# Patient Record
Sex: FEMALE | Race: WHITE | NOT HISPANIC OR LATINO | Employment: STUDENT | URBAN - METROPOLITAN AREA
[De-identification: names, ages, dates, MRNs, and addresses within clinical notes are randomized per-mention and may not be internally consistent; named-entity substitution may affect disease eponyms.]

---
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
## 2021-05-20 ENCOUNTER — OFFICE VISIT (OUTPATIENT)
Dept: FAMILY MEDICINE CLINIC | Facility: CLINIC | Age: 21
End: 2021-05-20

## 2021-05-20 VITALS
SYSTOLIC BLOOD PRESSURE: 118 MMHG | DIASTOLIC BLOOD PRESSURE: 70 MMHG | HEART RATE: 82 BPM | HEIGHT: 62 IN | BODY MASS INDEX: 27.42 KG/M2 | WEIGHT: 149 LBS | TEMPERATURE: 98.2 F | OXYGEN SATURATION: 99 %

## 2021-05-20 DIAGNOSIS — N91.2 AMENORRHEA: ICD-10-CM

## 2021-05-20 DIAGNOSIS — N91.1 SECONDARY AMENORRHEA: Primary | ICD-10-CM

## 2021-05-20 DIAGNOSIS — N64.4 BREAST TENDERNESS: ICD-10-CM

## 2021-05-20 DIAGNOSIS — K21.9 GASTROESOPHAGEAL REFLUX DISEASE WITHOUT ESOPHAGITIS: ICD-10-CM

## 2021-05-20 DIAGNOSIS — R53.83 FATIGUE, UNSPECIFIED TYPE: ICD-10-CM

## 2021-05-20 PROCEDURE — 99213 OFFICE O/P EST LOW 20 MIN: CPT | Performed by: FAMILY MEDICINE

## 2021-05-20 PROCEDURE — 81025 URINE PREGNANCY TEST: CPT | Performed by: FAMILY MEDICINE

## 2021-05-21 LAB
ALBUMIN SERPL-MCNC: 4.4 G/DL (ref 3.5–5.2)
ALBUMIN/GLOB SERPL: 2 G/DL
ALP SERPL-CCNC: 85 U/L (ref 39–117)
ALT SERPL-CCNC: 13 U/L (ref 1–33)
AST SERPL-CCNC: 15 U/L (ref 1–32)
B-HCG SERPL QL: NEGATIVE
BASOPHILS # BLD AUTO: 0.04 10*3/MM3 (ref 0–0.2)
BASOPHILS NFR BLD AUTO: 0.5 % (ref 0–1.5)
BILIRUB SERPL-MCNC: 0.4 MG/DL (ref 0–1.2)
BUN SERPL-MCNC: 11 MG/DL (ref 6–20)
BUN/CREAT SERPL: 15.7 (ref 7–25)
CALCIUM SERPL-MCNC: 9.6 MG/DL (ref 8.6–10.5)
CHLORIDE SERPL-SCNC: 105 MMOL/L (ref 98–107)
CO2 SERPL-SCNC: 24.6 MMOL/L (ref 22–29)
CREAT SERPL-MCNC: 0.7 MG/DL (ref 0.57–1)
EOSINOPHIL # BLD AUTO: 0.36 10*3/MM3 (ref 0–0.4)
EOSINOPHIL NFR BLD AUTO: 4.4 % (ref 0.3–6.2)
ERYTHROCYTE [DISTWIDTH] IN BLOOD BY AUTOMATED COUNT: 12.1 % (ref 12.3–15.4)
GLOBULIN SER CALC-MCNC: 2.2 GM/DL
GLUCOSE SERPL-MCNC: 79 MG/DL (ref 65–99)
HCT VFR BLD AUTO: 43.2 % (ref 34–46.6)
HGB BLD-MCNC: 14.1 G/DL (ref 12–15.9)
IMM GRANULOCYTES # BLD AUTO: 0.01 10*3/MM3 (ref 0–0.05)
IMM GRANULOCYTES NFR BLD AUTO: 0.1 % (ref 0–0.5)
LYMPHOCYTES # BLD AUTO: 2.88 10*3/MM3 (ref 0.7–3.1)
LYMPHOCYTES NFR BLD AUTO: 35.6 % (ref 19.6–45.3)
MCH RBC QN AUTO: 30.7 PG (ref 26.6–33)
MCHC RBC AUTO-ENTMCNC: 32.6 G/DL (ref 31.5–35.7)
MCV RBC AUTO: 93.9 FL (ref 79–97)
MONOCYTES # BLD AUTO: 0.63 10*3/MM3 (ref 0.1–0.9)
MONOCYTES NFR BLD AUTO: 7.8 % (ref 5–12)
NEUTROPHILS # BLD AUTO: 4.18 10*3/MM3 (ref 1.7–7)
NEUTROPHILS NFR BLD AUTO: 51.6 % (ref 42.7–76)
NRBC BLD AUTO-RTO: 0 /100 WBC (ref 0–0.2)
PLATELET # BLD AUTO: 276 10*3/MM3 (ref 140–450)
POTASSIUM SERPL-SCNC: 4.6 MMOL/L (ref 3.5–5.2)
PROLACTIN SERPL-MCNC: 11.2 NG/ML (ref 4.8–23.3)
PROT SERPL-MCNC: 6.6 G/DL (ref 6–8.5)
RBC # BLD AUTO: 4.6 10*6/MM3 (ref 3.77–5.28)
SODIUM SERPL-SCNC: 142 MMOL/L (ref 136–145)
TSH SERPL DL<=0.005 MIU/L-ACNC: 1.25 UIU/ML (ref 0.27–4.2)
WBC # BLD AUTO: 8.1 10*3/MM3 (ref 3.4–10.8)

## 2022-07-01 ENCOUNTER — OFFICE VISIT (OUTPATIENT)
Dept: FAMILY MEDICINE CLINIC | Facility: CLINIC | Age: 22
End: 2022-07-01

## 2022-07-01 VITALS
TEMPERATURE: 98 F | OXYGEN SATURATION: 98 % | DIASTOLIC BLOOD PRESSURE: 72 MMHG | SYSTOLIC BLOOD PRESSURE: 110 MMHG | WEIGHT: 148 LBS | BODY MASS INDEX: 27.23 KG/M2 | HEIGHT: 62 IN | HEART RATE: 58 BPM

## 2022-07-01 DIAGNOSIS — N36.8 URETHRAL IRRITATION: Primary | ICD-10-CM

## 2022-07-01 DIAGNOSIS — R30.0 DYSURIA: ICD-10-CM

## 2022-07-01 DIAGNOSIS — N89.8 VAGINAL DISCHARGE: ICD-10-CM

## 2022-07-01 PROCEDURE — 99213 OFFICE O/P EST LOW 20 MIN: CPT | Performed by: FAMILY MEDICINE

## 2022-07-01 RX ORDER — NITROFURANTOIN 25; 75 MG/1; MG/1
100 CAPSULE ORAL 2 TIMES DAILY
Qty: 10 CAPSULE | Refills: 0 | Status: SHIPPED | OUTPATIENT
Start: 2022-07-01 | End: 2022-07-06

## 2022-07-01 NOTE — PROGRESS NOTES
"Chief Complaint   Patient presents with   • Urinary Tract Infection       Urinary Tract Infection: Patient complains of burning with urination and frequency She has had symptoms for 5 days. Patient also complains of vaginal discharge. Patient denies back pain. Patient does not have a history of recurrent UTI.  Patient does not have a history of pyelonephritis. Used product/oil in pelvic area possibly contributing to irritation    Vitals:    07/01/22 1147   BP: 110/72   Pulse: 58   Temp: 98 °F (36.7 °C)   SpO2: 98%   Weight: 67.1 kg (148 lb)   Height: 157.5 cm (62.01\")     Gen: NAD,  alert  HEENT: WNL  Lung: regular RR, no audible wheeze  Heart: RR without murmur  Abd: non tender. : some mild urethral irrigation, clear Vaginal discharge      In office urine dipstick results:  Brief Urine Lab Results     None        UA dip was negative      Assessment & Plan   Diagnoses and all orders for this visit:    1. Urethral irritation (Primary)  -     NuSwab BV & Candida - Swab, Vagina    2. Vaginal discharge  -     NuSwab BV & Candida - Swab, Vagina    3. Dysuria    Other orders  -     nitrofurantoin, macrocrystal-monohydrate, (Macrobid) 100 MG capsule; Take 1 capsule by mouth 2 (Two) Times a Day for 5 days.  Dispense: 10 capsule; Refill: 0      tx macrobid for anti-inflamm and empirically for possible UTI, vaginal swab also pending       Tylenol or Advil as needed for pain, fever  Plenty of fluids  OTC Azo ok  Off work or school note given if needed.  Pros and cons of antibiotic use discussed    Dr. Angelina Cordova MD  [place]  [facility]    "

## 2022-07-04 LAB
A VAGINAE DNA VAG QL NAA+PROBE: NORMAL SCORE
BVAB2 DNA VAG QL NAA+PROBE: NORMAL SCORE
C ALBICANS DNA VAG QL NAA+PROBE: NEGATIVE
C GLABRATA DNA VAG QL NAA+PROBE: NEGATIVE
MEGA1 DNA VAG QL NAA+PROBE: NORMAL SCORE

## 2022-12-02 ENCOUNTER — OFFICE VISIT (OUTPATIENT)
Dept: FAMILY MEDICINE CLINIC | Facility: CLINIC | Age: 22
End: 2022-12-02

## 2022-12-02 VITALS
WEIGHT: 148 LBS | SYSTOLIC BLOOD PRESSURE: 110 MMHG | TEMPERATURE: 98.2 F | BODY MASS INDEX: 27.23 KG/M2 | HEART RATE: 79 BPM | HEIGHT: 62 IN | OXYGEN SATURATION: 99 % | DIASTOLIC BLOOD PRESSURE: 72 MMHG

## 2022-12-02 DIAGNOSIS — R22.9 SUBCUTANEOUS NODULE: Primary | ICD-10-CM

## 2022-12-02 PROCEDURE — 99213 OFFICE O/P EST LOW 20 MIN: CPT | Performed by: FAMILY MEDICINE

## 2022-12-02 NOTE — PROGRESS NOTES
Chief Complaint   Patient presents with   • Mass       Subjective   Anita Salas is a 22 y.o. female.     History of Present Illness add on visit    She noted a small subcutaneous bump on her mid upper abdomen earlier today    It became tender after she scratched the area. No obvious overlying redness or skin irritation, no abscess seen or palpated, not easily located on exam, no other systemic symptoms. No hx skin infections    The following portions of the patient's history were reviewed and updated as appropriate: allergies, current medications, past family history, past medical history, past social history, past surgical history and problem list.      No past medical history on file.    No past surgical history on file.    Family History   Problem Relation Age of Onset   • No Known Problems Mother    • No Known Problems Father        Social History     Socioeconomic History   • Marital status:    Tobacco Use   • Smoking status: Never   • Smokeless tobacco: Never   Vaping Use   • Vaping Use: Former   • Substances: Nicotine   • Devices: Disposable, Pre-filled or refillable cartridge   Substance and Sexual Activity   • Alcohol use: No   • Drug use: No   • Sexual activity: Never       No current outpatient medications on file prior to visit.     No current facility-administered medications on file prior to visit.       Review of Systems   Constitutional: Negative for fever.   HENT: Negative for congestion.    Cardiovascular: Negative for chest pain.   Skin: Positive for skin lesions.           Vitals:    12/02/22 1416   BP: 110/72   Pulse: 79   Temp: 98.2 °F (36.8 °C)   SpO2: 99%      Objective   Physical Exam  Vitals reviewed.   Cardiovascular:      Rate and Rhythm: Normal rate and regular rhythm.      Pulses: Normal pulses.   Pulmonary:      Effort: Pulmonary effort is normal.   Abdominal:      General: Abdomen is flat.   Neurological:      Mental Status: She is alert.   Psychiatric:         Mood and  Affect: Mood normal.       small subcutaneous bump on her mid upper abdomen not easily located on exam     Assessment & Plan   Problems Addressed this Visit    None  Visit Diagnoses     Subcutaneous nodule    -  Primary    Relevant Orders    US soft tissue      Diagnoses       Codes Comments    Subcutaneous nodule    -  Primary ICD-10-CM: R22.9  ICD-9-CM: 782.2         Small cyst versus lipoma versus other  Abdominal exam reassuring    Would have her start warm compresses or aleve bid for a week and give us an update. Can then further investigate if needed           Angelina Cordova MD

## 2022-12-09 ENCOUNTER — HOSPITAL ENCOUNTER (OUTPATIENT)
Dept: ULTRASOUND IMAGING | Facility: HOSPITAL | Age: 22
Discharge: HOME | End: 2022-12-09
Admitting: FAMILY MEDICINE

## 2022-12-09 DIAGNOSIS — R22.9 SUBCUTANEOUS NODULE: ICD-10-CM

## 2022-12-09 PROCEDURE — 76999 ECHO EXAMINATION PROCEDURE: CPT

## 2023-05-30 ENCOUNTER — OFFICE VISIT (OUTPATIENT)
Dept: FAMILY MEDICINE CLINIC | Facility: CLINIC | Age: 23
End: 2023-05-30

## 2023-05-30 VITALS
DIASTOLIC BLOOD PRESSURE: 72 MMHG | OXYGEN SATURATION: 99 % | HEART RATE: 75 BPM | TEMPERATURE: 97.8 F | HEIGHT: 62 IN | SYSTOLIC BLOOD PRESSURE: 120 MMHG | WEIGHT: 152 LBS | BODY MASS INDEX: 27.97 KG/M2

## 2023-05-30 DIAGNOSIS — R11.2 NAUSEA AND VOMITING, UNSPECIFIED VOMITING TYPE: Primary | ICD-10-CM

## 2023-05-30 DIAGNOSIS — R51.9 HEADACHE IN BACK OF HEAD: ICD-10-CM

## 2023-05-30 DIAGNOSIS — R10.84 GENERALIZED ABDOMINAL PAIN: ICD-10-CM

## 2023-05-30 DIAGNOSIS — G44.89 FOOD SENSITIVITY HEADACHE: ICD-10-CM

## 2023-05-30 PROCEDURE — 99214 OFFICE O/P EST MOD 30 MIN: CPT | Performed by: FAMILY MEDICINE

## 2023-05-30 NOTE — PROGRESS NOTES
Subjective   Anita Salas is a 22 y.o. female with   Chief Complaint   Patient presents with   • Vomiting     Started 3-4 weeks ago, would wake up in middle of night    • Headache   • Dizziness   • Neck Pain     Left    • Abdominal Pain   • Diarrhea     In the morning and after meals    • Chills     Only when has to go bathroom    .    History of Present Illness   22-year-old white female here with complaint of spontaneous onset of generalized abdominal pain usually starting at 7 to 8:00 at night.  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  Usually she is home at 5 and eats supper right after she arrives.  She generally does well until the above 7-8 o'clock timeframe when she develops abdominal discomfort/tightening as well as nausea and at times will actually experience emesis.  She has seen no hematemesis and there have been no bowel changes such as constipation, diarrhea, melena or hematochezia.  Patient denies fever but states that the abdominal discomfort will radiate up into her left neck.  She does have diarrhea on occasion but not until the next morning.  She wonders about her thyroid as well as possible food sensitivities- has family members that have the above including sister and father.  Patient does experience headaches with the above including lightheadedness on occasion.  Patient is also  and is having unprotected intercourse.  She states that her menses are regular and normal on a monthly basis last menses was at the beginning of this month.  She denies fatigue or breast tenderness.  The following portions of the patient's history were reviewed and updated as appropriate: allergies, current medications, past family history, past medical history, past social history, past surgical history and problem list.    Review of Systems   Constitutional: Positive for chills. Negative for fatigue and fever.   Gastrointestinal: Positive for  abdominal pain, diarrhea, nausea and vomiting.   Musculoskeletal: Positive for neck pain.   Neurological: Positive for dizziness and headaches.       Objective     Vitals:    05/30/23 1044   BP: 120/72   Pulse: 75   Temp: 97.8 °F (36.6 °C)   SpO2: 99%       No results found for this or any previous visit (from the past 672 hour(s)).    Physical Exam  Vitals and nursing note reviewed.   Constitutional:       Appearance: Normal appearance. She is well-developed and well-groomed.   HENT:      Head: Normocephalic and atraumatic.   Neck:      Thyroid: No thyroid mass or thyromegaly.      Vascular: Normal carotid pulses. No carotid bruit.      Trachea: Trachea and phonation normal.   Cardiovascular:      Rate and Rhythm: Normal rate and regular rhythm.      Heart sounds: Normal heart sounds. No murmur heard.    No friction rub. No gallop.   Pulmonary:      Effort: Pulmonary effort is normal. No respiratory distress.      Breath sounds: Normal breath sounds. No decreased breath sounds, wheezing, rhonchi or rales.   Abdominal:      General: Abdomen is flat. Bowel sounds are normal. There is no distension.      Palpations: Abdomen is soft. There is no hepatomegaly, splenomegaly or mass.      Tenderness: There is no abdominal tenderness. There is no right CVA tenderness, left CVA tenderness, guarding or rebound.      Hernia: No hernia is present.   Musculoskeletal:      Cervical back: Neck supple.   Lymphadenopathy:      Cervical: No cervical adenopathy.   Skin:     General: Skin is warm and dry.      Findings: No rash.   Neurological:      Mental Status: She is alert and oriented to person, place, and time.      Cranial Nerves: Cranial nerves 2-12 are intact.      Sensory: Sensation is intact.      Motor: Motor function is intact.      Gait: Gait is intact.      Deep Tendon Reflexes: Reflexes are normal and symmetric.   Psychiatric:         Attention and Perception: Attention and perception normal.         Mood and Affect:  Mood and affect normal.         Speech: Speech normal.         Behavior: Behavior normal. Behavior is cooperative.         Thought Content: Thought content normal.         Cognition and Memory: Cognition and memory normal.         Judgment: Judgment normal.         Assessment & Plan   Diagnoses and all orders for this visit:    1. Nausea and vomiting, unspecified vomiting type (Primary)  -     Comprehensive metabolic panel  -     Amylase  -     TSH  -     Vitamin D 25 hydroxy  -     Lipase  -     CBC w AUTO Differential  -     Allergen Food Panel  -     hCG, Serum, Qualitative    2. Headache in back of head  -     Comprehensive metabolic panel  -     Amylase  -     TSH  -     Vitamin D 25 hydroxy  -     Lipase  -     CBC w AUTO Differential  -     Allergen Food Panel  -     hCG, Serum, Qualitative    3. Generalized abdominal pain  -     Comprehensive metabolic panel  -     Amylase  -     TSH  -     Vitamin D 25 hydroxy  -     Lipase  -     CBC w AUTO Differential  -     Allergen Food Panel  -     hCG, Serum, Qualitative    4. Food sensitivity headache  -     Comprehensive metabolic panel  -     Amylase  -     TSH  -     Vitamin D 25 hydroxy  -     Lipase  -     CBC w AUTO Differential  -     Allergen Food Panel  -     hCG, Serum, Qualitative    Patient has been asked to pay attention to what she eats for dinner at night and its correlation possibly to her episodes later on in the evening.    Return if symptoms worsen or fail to improve.

## 2023-06-03 LAB
25(OH)D3+25(OH)D2 SERPL-MCNC: 36.5 NG/ML (ref 30–100)
ALBUMIN SERPL-MCNC: 4.5 G/DL (ref 3.5–5.2)
ALBUMIN/GLOB SERPL: 2 G/DL
ALP SERPL-CCNC: 68 U/L (ref 39–117)
ALT SERPL-CCNC: 16 U/L (ref 1–33)
AMYLASE SERPL-CCNC: 70 U/L (ref 28–100)
AST SERPL-CCNC: 16 U/L (ref 1–32)
B-HCG SERPL QL: NEGATIVE
BARLEY IGE QN: <0.1 KU/L
BASOPHILS # BLD AUTO: 0.05 10*3/MM3 (ref 0–0.2)
BASOPHILS NFR BLD AUTO: 0.6 % (ref 0–1.5)
BEEF IGE QN: <0.1 KU/L
BILIRUB SERPL-MCNC: 0.3 MG/DL (ref 0–1.2)
BUN SERPL-MCNC: 13 MG/DL (ref 6–20)
BUN/CREAT SERPL: 16.5 (ref 7–25)
CABBAGE IGE QN: <0.1 KU/L
CALCIUM SERPL-MCNC: 9.7 MG/DL (ref 8.6–10.5)
CARROT IGE QN: <0.1 KU/L
CHICKEN MEAT IGE QN: <0.1 KU/L
CHLORIDE SERPL-SCNC: 104 MMOL/L (ref 98–107)
CO2 SERPL-SCNC: 25.9 MMOL/L (ref 22–29)
CODFISH IGE QN: <0.1 KU/L
CONV CLASS DESCRIPTION: NORMAL
CORN IGE QN: <0.1 KU/L
COW MILK IGE QN: <0.1 KU/L
CRAB IGE QN: <0.1 KU/L
CREAT SERPL-MCNC: 0.79 MG/DL (ref 0.57–1)
EGFRCR SERPLBLD CKD-EPI 2021: 109.3 ML/MIN/1.73
EGG WHITE IGE QN: <0.1 KU/L
EOSINOPHIL # BLD AUTO: 0.3 10*3/MM3 (ref 0–0.4)
EOSINOPHIL NFR BLD AUTO: 3.8 % (ref 0.3–6.2)
ERYTHROCYTE [DISTWIDTH] IN BLOOD BY AUTOMATED COUNT: 12.1 % (ref 12.3–15.4)
GLOBULIN SER CALC-MCNC: 2.2 GM/DL
GLUCOSE SERPL-MCNC: 82 MG/DL (ref 65–99)
GRAPE IGE QN: <0.1 KU/L
GREEN PEPPER IGE QN: <0.1 KU/L
HCT VFR BLD AUTO: 41.3 % (ref 34–46.6)
HGB BLD-MCNC: 13.5 G/DL (ref 12–15.9)
IMM GRANULOCYTES # BLD AUTO: 0.02 10*3/MM3 (ref 0–0.05)
IMM GRANULOCYTES NFR BLD AUTO: 0.3 % (ref 0–0.5)
LETTUCE IGE QN: <0.1 KU/L
LIPASE SERPL-CCNC: 31 U/L (ref 13–60)
LYMPHOCYTES # BLD AUTO: 2.43 10*3/MM3 (ref 0.7–3.1)
LYMPHOCYTES NFR BLD AUTO: 30.4 % (ref 19.6–45.3)
MCH RBC QN AUTO: 30.3 PG (ref 26.6–33)
MCHC RBC AUTO-ENTMCNC: 32.7 G/DL (ref 31.5–35.7)
MCV RBC AUTO: 92.8 FL (ref 79–97)
MONOCYTES # BLD AUTO: 0.65 10*3/MM3 (ref 0.1–0.9)
MONOCYTES NFR BLD AUTO: 8.1 % (ref 5–12)
NEUTROPHILS # BLD AUTO: 4.55 10*3/MM3 (ref 1.7–7)
NEUTROPHILS NFR BLD AUTO: 56.8 % (ref 42.7–76)
NRBC BLD AUTO-RTO: 0 /100 WBC (ref 0–0.2)
OAT IGE QN: <0.1 KU/L
ORANGE IGE QN: <0.1 KU/L
PEANUT IGE QN: <0.1 KU/L
PLATELET # BLD AUTO: 260 10*3/MM3 (ref 140–450)
PORK IGE QN: <0.1 KU/L
POTASSIUM SERPL-SCNC: 4.3 MMOL/L (ref 3.5–5.2)
POTATO IGE QN: <0.1 KU/L
PROT SERPL-MCNC: 6.7 G/DL (ref 6–8.5)
RBC # BLD AUTO: 4.45 10*6/MM3 (ref 3.77–5.28)
RICE IGE QN: <0.1 KU/L
RYE IGE QN: <0.1 KU/L
SHRIMP IGE QN: <0.1 KU/L
SODIUM SERPL-SCNC: 139 MMOL/L (ref 136–145)
SOYBEAN IGE QN: <0.1 KU/L
TOMATO IGE QN: <0.1 KU/L
TSH SERPL DL<=0.005 MIU/L-ACNC: 1.95 UIU/ML (ref 0.27–4.2)
TUNA IGE QN: <0.1 KU/L
WBC # BLD AUTO: 8 10*3/MM3 (ref 3.4–10.8)
WHEAT IGE QN: <0.1 KU/L
WHITE BEAN IGE QN: <0.1 KU/L

## 2024-02-20 ENCOUNTER — APPOINTMENT (OUTPATIENT)
Dept: ULTRASOUND IMAGING | Facility: HOSPITAL | Age: 24
End: 2024-02-20
Payer: COMMERCIAL

## 2024-02-20 ENCOUNTER — HOSPITAL ENCOUNTER (EMERGENCY)
Facility: HOSPITAL | Age: 24
Discharge: HOME | End: 2024-02-20
Attending: EMERGENCY MEDICINE | Admitting: EMERGENCY MEDICINE
Payer: COMMERCIAL

## 2024-02-20 VITALS
OXYGEN SATURATION: 100 % | HEIGHT: 62 IN | BODY MASS INDEX: 23.9 KG/M2 | HEART RATE: 78 BPM | SYSTOLIC BLOOD PRESSURE: 126 MMHG | TEMPERATURE: 98.2 F | WEIGHT: 129.9 LBS | RESPIRATION RATE: 18 BRPM | DIASTOLIC BLOOD PRESSURE: 80 MMHG

## 2024-02-20 DIAGNOSIS — O20.9 BLEEDING IN EARLY PREGNANCY: Primary | ICD-10-CM

## 2024-02-20 LAB
ABO GROUP BLD: NORMAL
ALBUMIN SERPL-MCNC: 4.2 G/DL (ref 3.5–5.2)
ALBUMIN/GLOB SERPL: 1.8 G/DL
ALP SERPL-CCNC: 64 U/L (ref 39–117)
ALT SERPL W P-5'-P-CCNC: 15 U/L (ref 1–33)
ANION GAP SERPL CALCULATED.3IONS-SCNC: 10.1 MMOL/L (ref 5–15)
AST SERPL-CCNC: 19 U/L (ref 1–32)
BACTERIA UR QL AUTO: ABNORMAL /HPF
BASOPHILS # BLD AUTO: 0.06 10*3/MM3 (ref 0–0.2)
BASOPHILS NFR BLD AUTO: 0.7 % (ref 0–1.5)
BILIRUB SERPL-MCNC: 0.3 MG/DL (ref 0–1.2)
BILIRUB UR QL STRIP: NEGATIVE
BUN SERPL-MCNC: 12 MG/DL (ref 6–20)
BUN/CREAT SERPL: 15.4 (ref 7–25)
CALCIUM SPEC-SCNC: 8.8 MG/DL (ref 8.6–10.5)
CHLORIDE SERPL-SCNC: 104 MMOL/L (ref 98–107)
CLARITY UR: CLEAR
CO2 SERPL-SCNC: 22.9 MMOL/L (ref 22–29)
COLOR UR: YELLOW
CREAT SERPL-MCNC: 0.78 MG/DL (ref 0.57–1)
DEPRECATED RDW RBC AUTO: 44.9 FL (ref 37–54)
EGFRCR SERPLBLD CKD-EPI 2021: 110.3 ML/MIN/1.73
EOSINOPHIL # BLD AUTO: 0.37 10*3/MM3 (ref 0–0.4)
EOSINOPHIL NFR BLD AUTO: 4.6 % (ref 0.3–6.2)
ERYTHROCYTE [DISTWIDTH] IN BLOOD BY AUTOMATED COUNT: 12.7 % (ref 12.3–15.4)
GLOBULIN UR ELPH-MCNC: 2.4 GM/DL
GLUCOSE SERPL-MCNC: 86 MG/DL (ref 65–99)
GLUCOSE UR STRIP-MCNC: NEGATIVE MG/DL
HCG INTACT+B SERPL-ACNC: 22.58 MIU/ML
HCT VFR BLD AUTO: 42.3 % (ref 34–46.6)
HGB BLD-MCNC: 14 G/DL (ref 12–15.9)
HGB UR QL STRIP.AUTO: ABNORMAL
HYALINE CASTS UR QL AUTO: ABNORMAL /LPF
IMM GRANULOCYTES # BLD AUTO: 0.01 10*3/MM3 (ref 0–0.05)
IMM GRANULOCYTES NFR BLD AUTO: 0.1 % (ref 0–0.5)
KETONES UR QL STRIP: NEGATIVE
LEUKOCYTE ESTERASE UR QL STRIP.AUTO: NEGATIVE
LYMPHOCYTES # BLD AUTO: 2.3 10*3/MM3 (ref 0.7–3.1)
LYMPHOCYTES NFR BLD AUTO: 28.4 % (ref 19.6–45.3)
MCH RBC QN AUTO: 31.4 PG (ref 26.6–33)
MCHC RBC AUTO-ENTMCNC: 33.1 G/DL (ref 31.5–35.7)
MCV RBC AUTO: 94.8 FL (ref 79–97)
MONOCYTES # BLD AUTO: 0.71 10*3/MM3 (ref 0.1–0.9)
MONOCYTES NFR BLD AUTO: 8.8 % (ref 5–12)
NEUTROPHILS NFR BLD AUTO: 4.64 10*3/MM3 (ref 1.7–7)
NEUTROPHILS NFR BLD AUTO: 57.4 % (ref 42.7–76)
NITRITE UR QL STRIP: NEGATIVE
PH UR STRIP.AUTO: 6 [PH] (ref 4.5–8)
PLATELET # BLD AUTO: 258 10*3/MM3 (ref 140–450)
PMV BLD AUTO: 9.5 FL (ref 6–12)
POTASSIUM SERPL-SCNC: 3.7 MMOL/L (ref 3.5–5.2)
PROT SERPL-MCNC: 6.6 G/DL (ref 6–8.5)
PROT UR QL STRIP: NEGATIVE
RBC # BLD AUTO: 4.46 10*6/MM3 (ref 3.77–5.28)
RBC # UR STRIP: ABNORMAL /HPF
REF LAB TEST METHOD: ABNORMAL
RH BLD: POSITIVE
SODIUM SERPL-SCNC: 137 MMOL/L (ref 136–145)
SP GR UR STRIP: 1.03 (ref 1–1.03)
SQUAMOUS #/AREA URNS HPF: ABNORMAL /HPF
UROBILINOGEN UR QL STRIP: ABNORMAL
WBC # UR STRIP: ABNORMAL /HPF
WBC NRBC COR # BLD AUTO: 8.09 10*3/MM3 (ref 3.4–10.8)

## 2024-02-20 PROCEDURE — 81001 URINALYSIS AUTO W/SCOPE: CPT | Performed by: PHYSICIAN ASSISTANT

## 2024-02-20 PROCEDURE — 84702 CHORIONIC GONADOTROPIN TEST: CPT | Performed by: PHYSICIAN ASSISTANT

## 2024-02-20 PROCEDURE — 86901 BLOOD TYPING SEROLOGIC RH(D): CPT | Performed by: PHYSICIAN ASSISTANT

## 2024-02-20 PROCEDURE — 85025 COMPLETE CBC W/AUTO DIFF WBC: CPT | Performed by: PHYSICIAN ASSISTANT

## 2024-02-20 PROCEDURE — 86900 BLOOD TYPING SEROLOGIC ABO: CPT | Performed by: PHYSICIAN ASSISTANT

## 2024-02-20 PROCEDURE — P9612 CATHETERIZE FOR URINE SPEC: HCPCS

## 2024-02-20 PROCEDURE — 80053 COMPREHEN METABOLIC PANEL: CPT | Performed by: PHYSICIAN ASSISTANT

## 2024-02-20 PROCEDURE — 99284 EMERGENCY DEPT VISIT MOD MDM: CPT

## 2024-02-20 PROCEDURE — 36415 COLL VENOUS BLD VENIPUNCTURE: CPT

## 2024-02-20 PROCEDURE — 76817 TRANSVAGINAL US OBSTETRIC: CPT

## 2024-02-20 RX ORDER — PRENATAL VIT NO.126/IRON/FOLIC 28MG-0.8MG
1 TABLET ORAL DAILY
COMMUNITY

## 2024-02-20 RX ORDER — FERROUS SULFATE 325(65) MG
325 TABLET ORAL
COMMUNITY

## 2024-02-20 NOTE — ED PROVIDER NOTES
Subjective   History of Present Illness  Patient is a healthy 23-year-old female whose last period was round January 16.  She had a positive home pregnancy test on Monday and is scheduled to have her first OB visit in about 2 weeks.  She said that she had a home pregnancy test positive once in the past and is not sure if it was a false positive or if she had an early miscarriage.    She started having vaginal bleeding today without clots.  She says it has not been very heavy earlier.  She has had some lower abdominal cramping.  She denies any chance of STD and has not had symptoms of itching burning or in usual discharge.  She knows she has positive blood type.      Review of Systems   Constitutional: Negative.    HENT: Negative.     Gastrointestinal:  Positive for abdominal pain.   Genitourinary:  Positive for vaginal bleeding. Negative for vaginal discharge and vaginal pain.   All other systems reviewed and are negative.      History reviewed. No pertinent past medical history.    No Known Allergies    History reviewed. No pertinent surgical history.    Family History   Problem Relation Age of Onset    No Known Problems Mother     No Known Problems Father        Social History     Socioeconomic History    Marital status:    Tobacco Use    Smoking status: Never    Smokeless tobacco: Never   Vaping Use    Vaping Use: Former    Substances: Nicotine    Devices: Disposable, Pre-filled or refillable cartridge   Substance and Sexual Activity    Alcohol use: No    Drug use: No    Sexual activity: Never           Objective   Physical Exam  Vitals reviewed.   Constitutional:       Appearance: Normal appearance. She is normal weight.   Eyes:      Conjunctiva/sclera: Conjunctivae normal.   Cardiovascular:      Rate and Rhythm: Normal rate and regular rhythm.      Heart sounds: Normal heart sounds.   Pulmonary:      Effort: Pulmonary effort is normal.      Breath sounds: Normal breath sounds.   Abdominal:      General:  Abdomen is flat. Bowel sounds are normal. There is no distension.      Tenderness: There is no abdominal tenderness. There is no right CVA tenderness or guarding.   Musculoskeletal:         General: Normal range of motion.   Skin:     General: Skin is warm and dry.   Neurological:      Mental Status: She is alert.   Psychiatric:         Mood and Affect: Mood normal.         Behavior: Behavior normal.         Procedures           ED Course  ED Course as of 24 1648   Tue 2024   1423 HCG Quantitative: 22.58 [AN]   1526 HCG Quantitative: 22.58 [AN]      ED Course User Index  [AN] Rosaura Gillis PA-C                                             Medical Decision Making  Presentation patient is well-appearing and nontoxic.  She has benign abdominal exam.  Vital signs stable.    She said that her bleeding did  after she had the ultrasound and is not passing any clots.  Her hCG is 22 which could be missed  versus early pregnancy.  Other labs reassuring and no evidence of UTI.  Blood type a positive.    Ultrasound does not show evidence of IUP and could not rule out ectopic.  I discussed this with patient.  With hCG of 22 I would not expect to see fetus although it could be that but hCG is decreasing and there has been a miscarriage or an abnormal miscarriage that is not visualized.  She knows to follow-up with OB in the next couple days to have a repeat of hCG.  I also told her that miscarriage cannot be stopped in first trimester and her safety is her primary concern.  She should return to emergency room if she has any concerns and this can include heavy bleeding.  At this time she is medically cleared to follow-up with gynecologist in 2 days.    Problems Addressed:  Bleeding in early pregnancy: complicated acute illness or injury    Amount and/or Complexity of Data Reviewed  Labs: ordered. Decision-making details documented in ED Course.        Final diagnoses:   Bleeding in early pregnancy        ED Disposition  ED Disposition       ED Disposition   Discharge    Condition   Stable    Comment   --               Kayla Rodriguez, APRN  [address] [address] [phone]    In 2 days           Medication List      No changes were made to your prescriptions during this visit.            Rosaura Gillis PA-C  02/20/24 2646

## 2024-02-21 ENCOUNTER — HOSPITAL ENCOUNTER (EMERGENCY)
Facility: HOSPITAL | Age: 24
Discharge: HOME | End: 2024-02-21
Attending: EMERGENCY MEDICINE | Admitting: EMERGENCY MEDICINE
Payer: COMMERCIAL

## 2024-02-21 VITALS
RESPIRATION RATE: 18 BRPM | HEIGHT: 62 IN | DIASTOLIC BLOOD PRESSURE: 74 MMHG | BODY MASS INDEX: 23.74 KG/M2 | OXYGEN SATURATION: 100 % | TEMPERATURE: 98 F | WEIGHT: 129 LBS | HEART RATE: 70 BPM | SYSTOLIC BLOOD PRESSURE: 104 MMHG

## 2024-02-21 DIAGNOSIS — O20.0 THREATENED ABORTION IN EARLY PREGNANCY: Primary | ICD-10-CM

## 2024-02-21 LAB
HCG INTACT+B SERPL-ACNC: 16.24 MIU/ML
HCT VFR BLD AUTO: 40.8 % (ref 34–46.6)
HGB BLD-MCNC: 13.4 G/DL (ref 12–15.9)
HOLD SPECIMEN: NORMAL
HOLD SPECIMEN: NORMAL
WHOLE BLOOD HOLD COAG: NORMAL
WHOLE BLOOD HOLD SPECIMEN: NORMAL

## 2024-02-21 PROCEDURE — 85014 HEMATOCRIT: CPT | Performed by: EMERGENCY MEDICINE

## 2024-02-21 PROCEDURE — 84702 CHORIONIC GONADOTROPIN TEST: CPT | Performed by: EMERGENCY MEDICINE

## 2024-02-21 PROCEDURE — 99283 EMERGENCY DEPT VISIT LOW MDM: CPT

## 2024-02-21 PROCEDURE — 85018 HEMOGLOBIN: CPT | Performed by: EMERGENCY MEDICINE

## 2024-02-21 NOTE — ED PROVIDER NOTES
Subjective   History of Present Illness  Anita Salas is a 23-year-old white female who presents secondary to worsening pelvic pain and vaginal bleeding.  Patient is -0-4-0 at approximately 5 weeks.  Patient had onset of pelvic pain and vaginal bleeding approximately 8 AM yesterday morning 2024.  Patient was seen in this ED in the afternoon.  She is unsure of the results from this visit.  The vaginal bleeding is lighter than her typical menses.  However patient states she bleeds heavily with menses.  Patient is not under the care of an OB/GYN at this point.  She presents for evaluation.    History provided by:  Patient      Review of Systems   Constitutional: Negative.  Negative for fever.   HENT: Negative.  Negative for rhinorrhea.    Eyes: Negative.  Negative for redness.   Respiratory:  Negative for cough.    Cardiovascular:  Negative for chest pain.   Gastrointestinal:  Negative for abdominal pain.   Endocrine: Negative.    Genitourinary:  Positive for pelvic pain and vaginal bleeding. Negative for difficulty urinating.   Musculoskeletal: Negative.  Negative for back pain.   Skin: Negative.  Negative for color change.   Neurological: Negative.  Negative for syncope.   Hematological: Negative.    Psychiatric/Behavioral: Negative.     All other systems reviewed and are negative.      History reviewed. No pertinent past medical history.    No Known Allergies    History reviewed. No pertinent surgical history.    Family History   Problem Relation Age of Onset    No Known Problems Mother     No Known Problems Father        Social History     Socioeconomic History    Marital status:    Tobacco Use    Smoking status: Never    Smokeless tobacco: Never   Vaping Use    Vaping Use: Former    Substances: Nicotine    Devices: Disposable, Pre-filled or refillable cartridge   Substance and Sexual Activity    Alcohol use: No    Drug use: No    Sexual activity: Never           Objective   Physical  Exam  Vitals and nursing note reviewed.   Constitutional:       General: She is not in acute distress.     Appearance: Normal appearance. She is well-developed. She is not ill-appearing, toxic-appearing or diaphoretic.      Comments: 23-year-old white female lying in bed.  Patient appears in good overall health.  Vital signs unremarkable.  Patient friendly and cooperative.   HENT:      Head: Normocephalic and atraumatic.      Right Ear: Tympanic membrane, ear canal and external ear normal.      Left Ear: Tympanic membrane, ear canal and external ear normal.      Nose: Nose normal.      Mouth/Throat:      Mouth: Mucous membranes are moist.      Pharynx: Oropharynx is clear.   Eyes:      Extraocular Movements: Extraocular movements intact.      Conjunctiva/sclera: Conjunctivae normal.      Pupils: Pupils are equal, round, and reactive to light.   Cardiovascular:      Rate and Rhythm: Normal rate and regular rhythm.      Pulses: Normal pulses.      Heart sounds: Normal heart sounds. No murmur heard.     No friction rub. No gallop.   Pulmonary:      Effort: Pulmonary effort is normal.      Breath sounds: Normal breath sounds.   Abdominal:      General: Abdomen is flat. Bowel sounds are normal. There is no distension.      Palpations: Abdomen is soft. There is no mass.      Tenderness: There is abdominal tenderness (Mild) in the suprapubic area. There is no guarding or rebound. Negative signs include Portillo's sign, Rovsing's sign and McBurney's sign.      Hernia: No hernia is present.       Musculoskeletal:         General: Normal range of motion.      Cervical back: Normal range of motion and neck supple.   Skin:     General: Skin is warm and dry.      Capillary Refill: Capillary refill takes less than 2 seconds.   Neurological:      General: No focal deficit present.      Mental Status: She is alert and oriented to person, place, and time.      Deep Tendon Reflexes: Reflexes are normal and symmetric.   Psychiatric:          Mood and Affect: Mood normal.         Behavior: Behavior normal.         Procedures           ED Course  ED Course as of 02/21/24 0058   Wed Feb 21, 2024   0028 Repeating H&H as well as quantitative beta-hCG.  I have reviewed labs and ultrasound from 2/20/2024.  Quantitative beta-hCG was 22.  Ultrasound showed thickening of the endometrial stripe.  This is a nonspecific finding.  No evidence of IUP or ectopic pregnancy. [SS]   0050 Quantitative beta-hCG has dropped from 22.5-16.24.  This certainly suggest patient is in the process of a miscarriage. [SS]   0055 Discussed with patient all results as well as high likelihood that she has in the process of a spontaneous miscarriage.  Patient has used less than 1 pad per hour.  I do not feel D&C is indicated.  Strongly encourage patient follow-up with OB/GYN.  Discussed with patient all results, diagnoses, treatment and follow-up.  Will DC home. [SS]      ED Course User Index  [SS] Shyam Dominguez MD                                 Labs Reviewed   HEMOGLOBIN AND HEMATOCRIT, BLOOD - Normal   HCG, QUANTITATIVE, PREGNANCY    Narrative:     HCG Ranges by Gestational Age    Females - non-pregnant premenopausal   </= 1mIU/mL HCG  Females - postmenopausal               </= 7mIU/mL HCG    3 Weeks       5.4   -      72 mIU/mL  4 Weeks      10.2   -     708 mIU/mL  5 Weeks       217   -   8,245 mIU/mL  6 Weeks       152   -  32,177 mIU/mL  7 Weeks     4,059   - 153,767 mIU/mL  8 Weeks    31,366   - 149,094 mIU/mL  9 Weeks    59,109   - 135,901 mIU/mL  10 Weeks   44,186   - 170,409 mIU/mL  12 Weeks   27,107   - 201,615 mIU/mL  14 Weeks   24,302   -  93,646 mIU/mL  15 Weeks   12,540   -  69,747 mIU/mL  16 Weeks    8,904   -  55,332 mIU/mL  17 Weeks    8,240   -  51,793 mIU/mL  18 Weeks    9,649   -  55,271 mIU/mL     RAINBOW DRAW    Narrative:     The following orders were created for panel order [place] Draw.  Procedure                               Abnormality         Status                      ---------                               -----------         ------                     Green Top (Gel)[[id number]]                                  In process                 Lavender Top[[id number]]                                     In process                 Gold Top - SST[[id number]]                                   In process                 Light Blue Top[[id number]]                                   In process                   Please view results for these tests on the individual orders.   GREEN TOP   LAVENDER TOP   GOLD TOP - SST   LIGHT BLUE TOP       US Ob Transvaginal    Result Date: 2/20/2024  Narrative: US OB TRANSVAGINAL Date of Exam: 2/20/2024 2:46 PM EST Indication: r/o ectopic. Comparison: No comparisons available. Technique: Transvaginal ultrasound was performed to evaluate pregnancy.  Real time scanning was performed with multiple image documentation per protocol.  Findings: The endometrial stripe is thickened measuring 1.2 cm. The uterus measures 7.2 x 4.3 x 3.4 cm. There is no evidence for intrauterine pregnancy. The left ovary measures approximately 3.2 x 1.7 x 2.7 cm. Color flow does not appear unusual. The right ovary measures approximately 2 x 1.2 x 1.2 cm. Color flow to this ovary does not appear abnormal. There is some minimal free fluid in the left adnexa. hCG is reported to be 22.58 which is low. Intrauterine pregnancy would not be expected at this number.     Impression: Impression: 1.There is thickening of the endometrial stripe. Whether this reflects early changes of intrauterine pregnancy is uncertain. Correlation with patient's history as well as follow-up hCG and ultrasound as deemed necessary recommended. Ectopic could not be excluded on this exam at this time. 2.Minimal free fluid left adnexa Electronically Signed: Veto Huerta MD  2/20/2024 3:46 PM EST  Workstation ID: [id number]     My differential diagnosis for vaginal bleeding includes but is not limited to  normal menstruation, menorrhagia, menorrhagia, metromenorrhagia, threatened miscarriage, incomplete miscarriage, imminent miscarriage, dysfunctional uterine bleeding, bleeding disorders, vaginal trauma, STDs and PID.              Medical Decision Making  Problems Addressed:  Threatened  in early pregnancy: complicated acute illness or injury    Amount and/or Complexity of Data Reviewed  Labs: ordered.        Final diagnoses:   Threatened  in early pregnancy       ED Disposition  ED Disposition       ED Disposition   Discharge    Condition   Stable    Comment   --               Bartolo Rodriguez, DO  [address] [address] [phone]    Call   8:00 AM in the morning.  Inform office staff of ER visits and are concerned that you are in the process of a miscarriage.  Follow-up further recommendation.         Medication List      No changes were made to your prescriptions during this visit.            Shyam Dominguez MD  24 0058

## 2024-02-28 ENCOUNTER — OFFICE VISIT (OUTPATIENT)
Dept: OBSTETRICS AND GYNECOLOGY | Facility: CLINIC | Age: 24
End: 2024-02-28
Payer: COMMERCIAL

## 2024-02-28 VITALS
DIASTOLIC BLOOD PRESSURE: 58 MMHG | BODY MASS INDEX: 24.33 KG/M2 | HEIGHT: 62 IN | WEIGHT: 132.2 LBS | SYSTOLIC BLOOD PRESSURE: 98 MMHG

## 2024-02-28 DIAGNOSIS — O03.9 SPONTANEOUS ABORTION: Primary | ICD-10-CM

## 2024-02-28 DIAGNOSIS — N96 HISTORY OF RECURRENT MISCARRIAGES: ICD-10-CM

## 2024-02-28 PROBLEM — Z23 NEED FOR MENINGOCOCCAL VACCINATION: Status: RESOLVED | Noted: 2018-06-18 | Resolved: 2024-02-28

## 2024-02-28 PROBLEM — Z23 NEED FOR HEPATITIS A IMMUNIZATION: Status: RESOLVED | Noted: 2018-06-18 | Resolved: 2024-02-28

## 2024-02-28 PROBLEM — R35.0 URINE FREQUENCY: Status: RESOLVED | Noted: 2018-06-18 | Resolved: 2024-02-28

## 2024-02-28 PROBLEM — Z30.011 ENCOUNTER FOR INITIAL PRESCRIPTION OF CONTRACEPTIVE PILLS: Status: RESOLVED | Noted: 2018-06-18 | Resolved: 2024-02-28

## 2024-02-28 LAB
B-HCG UR QL: NEGATIVE
BILIRUB BLD-MCNC: NEGATIVE MG/DL
CLARITY, POC: CLEAR
COLOR UR: YELLOW
EXPIRATION DATE: NORMAL
GLUCOSE UR STRIP-MCNC: NEGATIVE MG/DL
HCG INTACT+B SERPL-ACNC: <1 MIU/ML
INTERNAL NEGATIVE CONTROL: NORMAL
INTERNAL POSITIVE CONTROL: NORMAL
KETONES UR QL: NEGATIVE
LEUKOCYTE EST, POC: NEGATIVE
Lab: NORMAL
NITRITE UR-MCNC: NEGATIVE MG/ML
PH UR: 5 [PH] (ref 5–8)
PROT UR STRIP-MCNC: NEGATIVE MG/DL
RBC # UR STRIP: NEGATIVE /UL
SP GR UR: 1 (ref 1–1.03)
UROBILINOGEN UR QL: NORMAL

## 2024-02-28 NOTE — PROGRESS NOTES
New GYN Exam    CC- Here for f/u threatened SAB in ER.     Anita Salas is a 23 y.o. female new patient who presents for possible SAB- f/u from ER.   LMP 2024; went to ER twice last week for vaginal bleeding/cramping.  Quant HCG was drawn at both visits- 1st one was 22; 2nd one was 16.  US performed showed no evidence of IUP.  Suspected to be 5 weeks gestation; quant level should be higher.  She stopped bleeding 2-3 days ago; cramps have greatly improved.  This is here 3rd SAB.      OB History          3    Para   0    Term   0       0    AB   3    Living   0         SAB   3    IAB   0    Ectopic   0    Molar   0    Multiple   0    Live Births   0                Menarche: 13 y.o.  Current contraception: condoms and rhythm method  History of abnormal Pap smear: no  History of abnormal mammogram:  N/A  Family history of uterine, colon or ovarian cancer: no  Family history of breast cancer: yes - paternal great aunt- dx unk  H/o STDs: no  Last pap:   Last mammogram: N/A  Last colonoscopy: N/A  Gardasil: uncertain if she received the vaccine  MAC: family history- ? [facility]    Health Maintenance   Topic Date Due    Annual Gynecologic Pelvic and Breast Exam  Never done    COVID-19 Vaccine (1) Never done    HPV VACCINES (1 - 2-dose series) Never done    HEPATITIS C SCREENING  Never done    CHLAMYDIA SCREENING  Never done    ANNUAL PHYSICAL  2020    TDAP/TD VACCINES (2 - Td or Tdap) 2022    PAP SMEAR  2022    INFLUENZA VACCINE  Never done    RSV Vaccine - Adults (1 - 1-dose 60+ series) 2061    MENINGOCOCCAL VACCINE  Completed    Pneumococcal Vaccine 0-64  Aged Out       Past Medical History:   Diagnosis Date    Anemia     Ovarian cyst        History reviewed. No pertinent surgical history.      Current Outpatient Medications:     ferrous sulfate 325 (65 FE) MG tablet, Take 1 tablet by mouth Daily With Breakfast., Disp: , Rfl:     prenatal vitamin (prenatal, CLASSIC, vitamin)  "tablet, Take 1 tablet by mouth Daily., Disp: , Rfl:     No Known Allergies    Social History     Tobacco Use    Smoking status: Never    Smokeless tobacco: Never   Vaping Use    Vaping Use: Former    Substances: Nicotine    Devices: Disposable, Pre-filled or refillable cartridge   Substance Use Topics    Alcohol use: No    Drug use: No       Family History   Problem Relation Age of Onset    No Known Problems Father     Polycystic ovary syndrome Mother     Breast cancer Paternal Grandmother     Diabetes Maternal Grandmother     Uterine cancer Neg Hx     Colon cancer Neg Hx     Ovarian cancer Neg Hx        Review of Systems   Genitourinary:  Positive for menstrual problem. Negative for pelvic pain and vaginal bleeding.        BP 98/58 (BP Location: Left arm, Patient Position: Sitting, Cuff Size: Adult)   Ht 157.5 cm (62\")   Wt 60 kg (132 lb 3.2 oz)   LMP 01/16/2024 (Approximate)   BMI 24.18 kg/m²     Physical Exam  Vitals reviewed.   Constitutional:       General: She is awake. She is not in acute distress.     Appearance: She is not ill-appearing.   Eyes:      Conjunctiva/sclera: Conjunctivae normal.   Pulmonary:      Effort: Pulmonary effort is normal. No respiratory distress.   Musculoskeletal:      Cervical back: Neck supple. No rigidity.   Skin:     General: Skin is warm and dry.      Capillary Refill: Capillary refill takes less than 2 seconds.   Neurological:      Mental Status: She is alert and oriented to person, place, and time.   Psychiatric:         Mood and Affect: Mood and affect normal.         Behavior: Behavior normal.               Assessment/Plan  1) SAB- UPT neg today; check quant HCG level  2) GYN HM:  due; will schedule     3) MMG: plan age 41  4) Colon Health: routine screening recommended if not up to date  5) STD screening: declines Condoms encouraged.  6) Gardasil: thinks she has completed the series  7) Contraception: condoms  8) family planning: no plans at present: encourage folic acid " daily  9) Body mass index is 24.18 kg/m². Diet and Exercise discussed  10) Smoking Status: former  11) Social: former       Diagnoses and all orders for this visit:    1. Spontaneous  (Primary)  -     POC Urinalysis Dipstick  -     HCG, B-subunit, Quantitative  -     POC Pregnancy, Urine    2. History of recurrent miscarriages        Follow up prn and 1 month for AE; check RPL labs    I spent 25 minutes on this encounter, before, during and after the visit, evaluating the patient, reviewing records and writing orders.      Kayla Rodriguez, APRN  2024  12:31 EST

## 2024-03-28 ENCOUNTER — OFFICE VISIT (OUTPATIENT)
Dept: OBSTETRICS AND GYNECOLOGY | Facility: CLINIC | Age: 24
End: 2024-03-28
Payer: COMMERCIAL

## 2024-03-28 VITALS
DIASTOLIC BLOOD PRESSURE: 66 MMHG | BODY MASS INDEX: 23.92 KG/M2 | SYSTOLIC BLOOD PRESSURE: 102 MMHG | WEIGHT: 130 LBS | HEIGHT: 62 IN

## 2024-03-28 DIAGNOSIS — N96 HISTORY OF RECURRENT MISCARRIAGES: ICD-10-CM

## 2024-03-28 DIAGNOSIS — Z01.419 ROUTINE GYNECOLOGICAL EXAMINATION: Primary | ICD-10-CM

## 2024-03-28 DIAGNOSIS — Z01.419 CERVICAL SMEAR, AS PART OF ROUTINE GYNECOLOGICAL EXAMINATION: ICD-10-CM

## 2024-03-28 DIAGNOSIS — Z11.3 ROUTINE SCREENING FOR STI (SEXUALLY TRANSMITTED INFECTION): ICD-10-CM

## 2024-03-28 LAB
B-HCG UR QL: NEGATIVE
BILIRUB BLD-MCNC: NEGATIVE MG/DL
CLARITY, POC: CLEAR
COLOR UR: YELLOW
EXPIRATION DATE: NORMAL
GLUCOSE UR STRIP-MCNC: NEGATIVE MG/DL
INTERNAL NEGATIVE CONTROL: NORMAL
INTERNAL POSITIVE CONTROL: NORMAL
KETONES UR QL: NEGATIVE
LEUKOCYTE EST, POC: NEGATIVE
Lab: NORMAL
NITRITE UR-MCNC: NEGATIVE MG/ML
PH UR: 5 [PH] (ref 5–8)
PROT UR STRIP-MCNC: NEGATIVE MG/DL
RBC # UR STRIP: NEGATIVE /UL
SP GR UR: 1 (ref 1–1.03)
UROBILINOGEN UR QL: NORMAL

## 2024-03-28 NOTE — PROGRESS NOTES
GYN Annual Exam     Chief Complaint   Patient presents with    Gynecologic Exam       Anita Salas is a 23 y.o. female who presents for annual well woman exam. She is new to me - no. She is sexually active. Currently using condom for contraception. She is happy with her contraception: Yes.     Periods are regular every 28-30 days, lasting 5-7 days. Dysmenorrhea: moderate-severe, occurring throughout menses. Cyclic symptoms include bloating and acne . No intermenstrual bleeding, spotting, or discharge.  Performing SBE: occasional    Hx of SAB x 3; last one in 2024    HPI    OB History          3    Para   0    Term   0       0    AB   3    Living   0         SAB   3    IAB   0    Ectopic   0    Molar   0    Multiple   0    Live Births   0                Last Pap: 2019- NIL  Last Mammogram: N/A  Last Colonoscopy: N/A  History of abnormal Pap smear: no  Family history of uterine, colon or ovarian cancer: no  Family history of breast cancer: yes - paternal great aunt in her 50's  History of abnormal mammogram:  N/A  Gardasil Vaccine: unsure if she received    Past Medical History:   Diagnosis Date    Anemia     Ovarian cyst        History reviewed. No pertinent surgical history.      Current Outpatient Medications:     ferrous sulfate 325 (65 FE) MG tablet, Take 1 tablet by mouth Daily With Breakfast., Disp: , Rfl:     prenatal vitamin (prenatal, CLASSIC, vitamin) tablet, Take 1 tablet by mouth Daily., Disp: , Rfl:     No Known Allergies    Social History     Tobacco Use    Smoking status: Never    Smokeless tobacco: Never   Vaping Use    Vaping status: Former    Substances: Nicotine    Devices: Disposable, Pre-filled or refillable cartridge   Substance Use Topics    Alcohol use: No    Drug use: No       Family History   Problem Relation Age of Onset    No Known Problems Father     Polycystic ovary syndrome Mother     Diabetes Maternal Grandmother     Breast cancer Paternal Aunt 50 - 59    Uterine  "cancer Neg Hx     Colon cancer Neg Hx     Ovarian cancer Neg Hx        Review of Systems   Genitourinary:  Negative for breast discharge, breast lump, breast pain, menstrual problem and pelvic pain.   Hematological:  Does not bruise/bleed easily.       Yes- tested for chlamydia    /66   Ht 157.5 cm (62\")   Wt 59 kg (130 lb)   LMP 03/19/2024 (Approximate)   Breastfeeding No   BMI 23.78 kg/m²     Physical Exam  Vitals reviewed.   Constitutional:       General: She is awake. She is not in acute distress.     Appearance: She is not ill-appearing.   HENT:      Head: Normocephalic and atraumatic.   Eyes:      Conjunctiva/sclera: Conjunctivae normal.   Pulmonary:      Effort: Pulmonary effort is normal. No respiratory distress.   Chest:   Breasts:     Right: Normal.      Left: Normal.   Abdominal:      Palpations: Abdomen is soft. There is no mass.      Tenderness: There is no abdominal tenderness.   Genitourinary:     General: Normal vulva.      Exam position: Supine.      Pubic Area: No rash.       Labia:         Right: No rash.         Left: No rash.       Urethra: No urethral lesion.      Vagina: Normal.      Cervix: Normal.      Uterus: Normal.       Adnexa: Right adnexa normal and left adnexa normal.   Musculoskeletal:      Cervical back: Neck supple. No rigidity.   Lymphadenopathy:      Upper Body:      Right upper body: No axillary adenopathy.      Left upper body: No axillary adenopathy.      Lower Body: No right inguinal adenopathy. No left inguinal adenopathy.   Skin:     General: Skin is warm and dry.      Capillary Refill: Capillary refill takes less than 2 seconds.   Neurological:      Mental Status: She is alert and oriented to person, place, and time.   Psychiatric:         Mood and Affect: Mood and affect normal.         Behavior: Behavior normal.            Assessment     Well woman exam   Contraception management  RPL    Plan   Well woman exam: Pap collected- Yes. Instructed on how to perform " SBE. Recommend MVI daily.    Breast Health: Clinical breast exam & Self breast awareness.   Colon health:  colonoscopy due at 46 y.o.  Contraception: condoms  STD: Enc condoms.  STD screen today- Yes. Pap   Gardasil: Discussed with patient risks, benefits and alternatives to the Gardasil vaccination.  The vaccine is administered in the arm in a series of 3 shots at 0, 2 and 6 months.  It provides a 70% to 90% reduction in HPV related diseases such as abnormal Pap smears, genital warts and cervical cancer.  The vaccine was originally approved for ages 9-26, but is recently been expanded to the age of 45.  The most common side effects are pain at the injection site and fainting.  Any and all adverse side effects are tracked by the FDA and are available on their website for review.  After consideration, the patient is undecided; information provided.    Smoking status:  No  Body mass index is 23.78 kg/m². Diet and exercised discussed.  9.    RPL- labs drawn today    Follow-up as needed and 1 year for AE.    I spent 15 minutes caring for Anita on this date of service. This time includes time spent by me in the following activities: preparing for the visit, reviewing tests, obtaining and/or reviewing a separately obtained history, performing a medically appropriate examination and/or evaluation, counseling and educating the patient/family/caregiver, ordering medications, tests, or procedures, and documenting information in the medical record.      I spent 5 minutes on the separately reported service of RPL. This time is not included in the time used to support the E/M service also reported today.     Kayla Rodriguez, APRN  3/28/2024  11:56 EDT

## 2024-04-01 LAB
C TRACH RRNA CVX QL NAA+PROBE: NEGATIVE
CONV .: NORMAL
CYTOLOGIST CVX/VAG CYTO: NORMAL
CYTOLOGY CVX/VAG DOC CYTO: NORMAL
CYTOLOGY CVX/VAG DOC THIN PREP: NORMAL
DX ICD CODE: NORMAL
Lab: NORMAL
N GONORRHOEA RRNA CVX QL NAA+PROBE: NEGATIVE
OTHER STN SPEC: NORMAL
STAT OF ADQ CVX/VAG CYTO-IMP: NORMAL
T VAGINALIS RRNA SPEC QL NAA+PROBE: NEGATIVE

## 2024-04-04 LAB
APTT HEX PL PPP: 6 SEC
APTT IMM NP PPP: NORMAL SEC
APTT PPP 1:1 SALINE: NORMAL SEC
APTT PPP: 29 SEC
B2 GLYCOPROT1 IGA SER-ACNC: <10 SAU
B2 GLYCOPROT1 IGA SER-ACNC: <9 GPI IGA UNITS (ref 0–25)
B2 GLYCOPROT1 IGG SER-ACNC: <10 SGU
B2 GLYCOPROT1 IGG SER-ACNC: <9 GPI IGG UNITS (ref 0–20)
B2 GLYCOPROT1 IGM SER-ACNC: <10 SMU
B2 GLYCOPROT1 IGM SER-ACNC: <9 GPI IGM UNITS (ref 0–32)
CARDIOLIPIN IGG SER IA-ACNC: <10 GPL
CARDIOLIPIN IGM SER IA-ACNC: <10 MPL
CONFIRM APTT: 0.7 SEC
CONFIRM DRVVT: NORMAL SEC
DRVVT SCREEN TO CONFIRM RATIO: NORMAL RATIO
INR PPP: 1.1 RATIO
LABORATORY COMMENT REPORT: NORMAL
PROTHROMBIN TIME: 11.3 SEC
SCREEN DRVVT: 34.6 SEC
THROMBIN TIME: 18.6 SEC
TSH SERPL DL<=0.005 MIU/L-ACNC: 1.52 UIU/ML (ref 0.45–4.5)

## 2024-10-03 ENCOUNTER — OFFICE VISIT (OUTPATIENT)
Dept: OBSTETRICS AND GYNECOLOGY | Facility: CLINIC | Age: 24
End: 2024-10-03
Payer: COMMERCIAL

## 2024-10-03 VITALS
DIASTOLIC BLOOD PRESSURE: 72 MMHG | WEIGHT: 127.2 LBS | BODY MASS INDEX: 23.41 KG/M2 | SYSTOLIC BLOOD PRESSURE: 112 MMHG | HEIGHT: 62 IN

## 2024-10-03 DIAGNOSIS — N63.0 MASS OF BREAST, UNSPECIFIED LATERALITY: Primary | ICD-10-CM

## 2024-10-03 DIAGNOSIS — N64.59 INVERTED NIPPLE: ICD-10-CM

## 2024-10-03 NOTE — PROGRESS NOTES
"Subjective     Chief Complaint   Patient presents with    Follow-up     Left breast lump       Anita Salas is a 24 y.o.  whose LMP is Patient's last menstrual period was 2024 (approximate).. This patient is new to me- no.  She presents with lump in left breast    HPI    Noticed lump in left breast 2 weeks ago; was on menstrual cycle at the time.  Still feels the lump today.  Denies tobacco use.  Family hx + for breast cancer- paternal great aunt.   Drinks 1 cup of coffee/day- has cut back in caffeine consumption over the past year.         The following portions of the patient's history were reviewed and updated as appropriate:vital signs, allergies, current medications, past family history, past medical history, past social history, past surgical history, and problem list      Review of Systems     Review of Systems   Genitourinary:  Positive for breast lump. Negative for breast discharge and breast pain.       Objective      /72   Ht 157.5 cm (62.01\")   Wt 57.7 kg (127 lb 3.2 oz)   LMP 2024 (Approximate)   BMI 23.26 kg/m²     Physical Exam    Physical Exam  Vitals reviewed.   Constitutional:       General: She is awake. She is not in acute distress.     Appearance: She is not ill-appearing.   Eyes:      Conjunctiva/sclera: Conjunctivae normal.   Pulmonary:      Effort: Pulmonary effort is normal. No respiratory distress.   Chest:   Breasts:     Right: Mass (upper/outer quadrant) present. No swelling, bleeding, inverted nipple, nipple discharge, skin change or tenderness.      Left: Inverted nipple and mass (upper/inner quadrant) present. No swelling, bleeding, nipple discharge, skin change or tenderness.   Musculoskeletal:      Cervical back: Neck supple. No rigidity.   Lymphadenopathy:      Upper Body:      Right upper body: No axillary adenopathy.      Left upper body: No axillary adenopathy.   Skin:     General: Skin is warm and dry.      Capillary Refill: Capillary refill takes " less than 2 seconds.   Neurological:      Mental Status: She is alert and oriented to person, place, and time.   Psychiatric:         Mood and Affect: Mood and affect normal.         Behavior: Behavior normal.           Lab Review   Labs: Urine pregnancy test, Urinalysis - with micro     Imaging: No data reviewed      Assessment/Plan  Diagnoses and all orders for this visit:    1. Mass of breast, unspecified laterality (Primary)  -     POC Urinalysis Dipstick  -     POC Pregnancy, Urine  -     US Breast Bilateral Complete; Future    2. Inverted nipple  -     US Breast Bilateral Complete; Future      Palpable mass bilaterally; appears c/w fibrocystic changes.  Check bilateral US of breast.        Return if symptoms worsen or fail to improve.      Kayla Rodriguez, APRN  10/3/2024  16:57 EDT

## 2024-10-16 ENCOUNTER — HOSPITAL ENCOUNTER (OUTPATIENT)
Dept: ULTRASOUND IMAGING | Facility: HOSPITAL | Age: 24
Discharge: HOME | End: 2024-10-16
Admitting: NURSE PRACTITIONER
Payer: COMMERCIAL

## 2024-10-16 DIAGNOSIS — N63.11 MASS OF UPPER OUTER QUADRANT OF RIGHT BREAST: Primary | ICD-10-CM

## 2024-10-16 DIAGNOSIS — N64.59 INVERTED NIPPLE: ICD-10-CM

## 2024-10-16 DIAGNOSIS — N63.0 MASS OF BREAST, UNSPECIFIED LATERALITY: ICD-10-CM

## 2024-10-16 PROCEDURE — 76642 ULTRASOUND BREAST LIMITED: CPT

## 2025-01-13 ENCOUNTER — OFFICE VISIT (OUTPATIENT)
Dept: OBSTETRICS AND GYNECOLOGY | Facility: CLINIC | Age: 25
End: 2025-01-13
Payer: COMMERCIAL

## 2025-01-13 VITALS
BODY MASS INDEX: 24.48 KG/M2 | HEIGHT: 62 IN | SYSTOLIC BLOOD PRESSURE: 110 MMHG | DIASTOLIC BLOOD PRESSURE: 64 MMHG | WEIGHT: 133 LBS

## 2025-01-13 DIAGNOSIS — R19.8 IRREGULAR BOWEL HABITS: ICD-10-CM

## 2025-01-13 DIAGNOSIS — R10.2 PELVIC PAIN: Primary | ICD-10-CM

## 2025-01-13 DIAGNOSIS — Z13.89 SCREENING FOR GENITOURINARY CONDITION: ICD-10-CM

## 2025-01-13 DIAGNOSIS — N89.8 VAGINAL DISCHARGE: ICD-10-CM

## 2025-01-13 DIAGNOSIS — N84.1 CERVICAL POLYP: ICD-10-CM

## 2025-01-13 LAB
B-HCG UR QL: NEGATIVE
BILIRUB BLD-MCNC: NEGATIVE MG/DL
CLARITY, POC: CLEAR
COLOR UR: YELLOW
EXPIRATION DATE: NORMAL
GLUCOSE UR STRIP-MCNC: NEGATIVE MG/DL
INTERNAL NEGATIVE CONTROL: NORMAL
INTERNAL POSITIVE CONTROL: NORMAL
KETONES UR QL: NEGATIVE
LEUKOCYTE EST, POC: NEGATIVE
Lab: NORMAL
NITRITE UR-MCNC: NEGATIVE MG/ML
PH UR: 5 [PH] (ref 5–8)
PROT UR STRIP-MCNC: NEGATIVE MG/DL
RBC # UR STRIP: NEGATIVE /UL
SP GR UR: 1.02 (ref 1–1.03)
UROBILINOGEN UR QL: NORMAL

## 2025-01-13 NOTE — PROGRESS NOTES
"Subjective     Chief Complaint   Patient presents with    Vaginitis       Anita Salas is a 24 y.o.  whose LMP is Patient's last menstrual period was 2024.. She presents with c/o pelvic cramping and vaginal discharge. States, \"things have been a little off.\" She is not on contraception. She is sexually active. She uses both withdrawal and condoms. She reports feeling pelvic cramping like period cramps but no period. She also c/o vaginal discharge that \"is a little off.\" She reports her discharge is \"whiter\" with a different odor.     She denies changes in sexual partners.     She is struggling with her bowels. She is trying to home remedy her issues. She is using tumeric and bhupinder drink. If she does this, she will have a normal bowel movement \"most of the time.\" She has gone up to two weeks without a bowel movement. She reports this occurs on and off.     HPI    HPI    The following portions of the patient's history were reviewed and updated as appropriate:vital signs, allergies, current medications, past medical history, past social history, past surgical history, and problem list      Review of Systems     Review of Systems   Constitutional: Negative.  Negative for chills and fever.   Gastrointestinal:         Bowel irregularities    Genitourinary:  Positive for pelvic pain and vaginal discharge.       Objective      /64   Ht 157.5 cm (62.01\")   Wt 60.3 kg (133 lb)   LMP 2024   BMI 24.32 kg/m²     Physical Exam    Physical Exam  Vitals and nursing note reviewed.   Constitutional:       Appearance: Normal appearance.   Genitourinary:     Labia:         Right: No rash, tenderness or lesion.         Left: No rash, tenderness or lesion.       Vagina: No signs of injury and foreign body. Vaginal discharge present. No erythema, tenderness or bleeding.      Cervix: No cervical motion tenderness, discharge, friability, lesion, erythema or cervical bleeding.      Uterus: Not deviated, not " enlarged, not fixed and not tender.       Adnexa:         Right: No mass or tenderness.          Left: No mass or tenderness.              Comments: Polyp noted. Polyp grabbed with alligator forceps. Forcep twised x 1 and polyp was removed   Musculoskeletal:         General: Normal range of motion.   Skin:     General: Skin is warm and dry.   Neurological:      General: No focal deficit present.      Mental Status: She is alert and oriented to person, place, and time.   Psychiatric:         Mood and Affect: Mood normal.         Behavior: Behavior normal.         Lab Review   Labs: Urine pregnancy test, Urinalysis - with micro     Imaging   Ultrasound -     Assessment  Diagnoses and all orders for this visit:    1. Pelvic pain (Primary)    2. Screening for genitourinary condition  -     POC Urinalysis Dipstick  -     POC Pregnancy, Urine    3. Vaginal discharge  -     NuSwab VG+ - Swab, Vagina  -     Genital Mycoplasmas JANIS, Swab - Swab, Vagina    4. Irregular bowel habits  -     Ambulatory Referral to Gastroenterology    5. Cervical polyp  -     Reference Histopathology      Assessment/Plan:   Pelvic pain- Uncertain cause. Concern for bowel related. Check NuSwab. US normal other than cervical polyp.   Vaginal discharge- Check NuSwab. Plan to treat based on swab results.   Cervical polyp- Removed without difficulty, sent to path  Irregular bowel habit- Ref to GI     RTO  PRN and 1 year AE       Yudith Morales, APRN  1/13/2025

## 2025-01-15 LAB
DX ICD CODE: NORMAL
DX ICD CODE: NORMAL
PATH REPORT.FINAL DX SPEC: NORMAL
PATH REPORT.GROSS SPEC: NORMAL
PATH REPORT.RELEVANT HX SPEC: NORMAL
PATH REPORT.SITE OF ORIGIN SPEC: NORMAL
PATHOLOGIST NAME: NORMAL
PAYMENT PROCEDURE: NORMAL

## 2025-01-16 LAB
A VAGINAE DNA VAG QL NAA+PROBE: NORMAL SCORE
BVAB2 DNA VAG QL NAA+PROBE: NORMAL SCORE
C ALBICANS DNA VAG QL NAA+PROBE: NEGATIVE
C GLABRATA DNA VAG QL NAA+PROBE: NEGATIVE
C TRACH DNA SPEC QL NAA+PROBE: NEGATIVE
M GENITALIUM DNA SPEC QL NAA+PROBE: NEGATIVE
M HOMINIS DNA SPEC QL NAA+PROBE: NEGATIVE
MEGA1 DNA VAG QL NAA+PROBE: NORMAL SCORE
N GONORRHOEA DNA VAG QL NAA+PROBE: NEGATIVE
T VAGINALIS DNA VAG QL NAA+PROBE: NEGATIVE
UREAPLASMA DNA SPEC QL NAA+PROBE: POSITIVE

## 2025-01-17 RX ORDER — AZITHROMYCIN 250 MG/1
TABLET, FILM COATED ORAL
Qty: 6 TABLET | Refills: 0 | Status: SHIPPED | OUTPATIENT
Start: 2025-01-17 | End: 2025-01-22

## 2025-01-28 ENCOUNTER — OFFICE VISIT (OUTPATIENT)
Dept: GASTROENTEROLOGY | Facility: CLINIC | Age: 25
End: 2025-01-28
Payer: COMMERCIAL

## 2025-01-28 ENCOUNTER — LAB (OUTPATIENT)
Dept: LAB | Facility: HOSPITAL | Age: 25
End: 2025-01-28
Payer: COMMERCIAL

## 2025-01-28 VITALS
HEIGHT: 62 IN | DIASTOLIC BLOOD PRESSURE: 80 MMHG | WEIGHT: 111 LBS | BODY MASS INDEX: 20.43 KG/M2 | SYSTOLIC BLOOD PRESSURE: 120 MMHG

## 2025-01-28 DIAGNOSIS — R19.4 CHANGE IN BOWEL HABITS: ICD-10-CM

## 2025-01-28 DIAGNOSIS — K59.04 CHRONIC IDIOPATHIC CONSTIPATION: Primary | ICD-10-CM

## 2025-01-28 LAB
BASOPHILS # BLD AUTO: 0.05 10*3/MM3 (ref 0–0.2)
BASOPHILS NFR BLD AUTO: 0.7 % (ref 0–1.5)
DEPRECATED RDW RBC AUTO: 40 FL (ref 37–54)
EOSINOPHIL # BLD AUTO: 0.23 10*3/MM3 (ref 0–0.4)
EOSINOPHIL NFR BLD AUTO: 3.4 % (ref 0.3–6.2)
ERYTHROCYTE [DISTWIDTH] IN BLOOD BY AUTOMATED COUNT: 11.5 % (ref 12.3–15.4)
FERRITIN SERPL-MCNC: 66 NG/ML (ref 13–150)
FOLATE SERPL-MCNC: 9.44 NG/ML (ref 4.78–24.2)
HCT VFR BLD AUTO: 43.1 % (ref 34–46.6)
HGB BLD-MCNC: 14.4 G/DL (ref 12–15.9)
IMM GRANULOCYTES # BLD AUTO: 0.01 10*3/MM3 (ref 0–0.05)
IMM GRANULOCYTES NFR BLD AUTO: 0.1 % (ref 0–0.5)
IRON 24H UR-MRATE: 79 MCG/DL (ref 37–145)
IRON SATN MFR SERPL: 19 % (ref 20–50)
LYMPHOCYTES # BLD AUTO: 2.39 10*3/MM3 (ref 0.7–3.1)
LYMPHOCYTES NFR BLD AUTO: 35.8 % (ref 19.6–45.3)
MCH RBC QN AUTO: 31.8 PG (ref 26.6–33)
MCHC RBC AUTO-ENTMCNC: 33.4 G/DL (ref 31.5–35.7)
MCV RBC AUTO: 95.1 FL (ref 79–97)
MONOCYTES # BLD AUTO: 0.43 10*3/MM3 (ref 0.1–0.9)
MONOCYTES NFR BLD AUTO: 6.4 % (ref 5–12)
NEUTROPHILS NFR BLD AUTO: 3.56 10*3/MM3 (ref 1.7–7)
NEUTROPHILS NFR BLD AUTO: 53.6 % (ref 42.7–76)
NRBC BLD AUTO-RTO: 0 /100 WBC (ref 0–0.2)
PLATELET # BLD AUTO: 264 10*3/MM3 (ref 140–450)
PMV BLD AUTO: 12.4 FL (ref 6–12)
RBC # BLD AUTO: 4.53 10*6/MM3 (ref 3.77–5.28)
TIBC SERPL-MCNC: 413 MCG/DL (ref 298–536)
TRANSFERRIN SERPL-MCNC: 277 MG/DL (ref 200–360)
TSH SERPL DL<=0.05 MIU/L-ACNC: 1.25 UIU/ML (ref 0.27–4.2)
VIT B12 BLD-MCNC: 670 PG/ML (ref 211–946)
WBC NRBC COR # BLD AUTO: 6.67 10*3/MM3 (ref 3.4–10.8)

## 2025-01-28 PROCEDURE — 36415 COLL VENOUS BLD VENIPUNCTURE: CPT

## 2025-01-28 PROCEDURE — 99214 OFFICE O/P EST MOD 30 MIN: CPT

## 2025-01-28 PROCEDURE — 84443 ASSAY THYROID STIM HORMONE: CPT

## 2025-01-28 PROCEDURE — 83540 ASSAY OF IRON: CPT

## 2025-01-28 PROCEDURE — 86364 TISS TRNSGLTMNASE EA IG CLAS: CPT

## 2025-01-28 PROCEDURE — 84466 ASSAY OF TRANSFERRIN: CPT

## 2025-01-28 PROCEDURE — 82607 VITAMIN B-12: CPT

## 2025-01-28 PROCEDURE — 82728 ASSAY OF FERRITIN: CPT

## 2025-01-28 PROCEDURE — 82784 ASSAY IGA/IGD/IGG/IGM EACH: CPT

## 2025-01-28 PROCEDURE — 86231 EMA EACH IG CLASS: CPT

## 2025-01-28 PROCEDURE — 82746 ASSAY OF FOLIC ACID SERUM: CPT

## 2025-01-28 PROCEDURE — 86258 DGP ANTIBODY EACH IG CLASS: CPT

## 2025-01-28 PROCEDURE — 85025 COMPLETE CBC W/AUTO DIFF WBC: CPT

## 2025-01-28 NOTE — PROGRESS NOTES
Chief Complaint   Patient presents with    irregular bowl habits     Irritable Bowel Syndrome         Patient is a 24 y.o. who presents to the office as a new patient for further evaluation of irregular bowel habits and pelvic pain after referral received from VELVET Wiseman. patient has a significant past medical history of anemia and constipation      EGD and Colonoscopy: NONE     Past Surgical History:  No significant past surgical history    Social History:  Denies use of alcohol, illicit drug use or tobacco    Family history:  Denies known family history of colon cancer, colon polyps, or IBD    History of Present Illness  The patient presents for evaluation of chronic pelvic pain.    She reports persistent pelvic pain since February or March 2024, subsequent to a miscarriage at 6 weeks gestation. The patient describes chronic constipation, with the longest interval without a bowel movement being two weeks, and another episode lasting one and a half weeks, accompanied by sensations of fullness and bloating.     Dietary adjustments have included increased fiber intake and turmeric bhupinder shots. She restricts coffee consumption to one cup per day and observes more frequent bowel movements with protein-rich foods. Symptomatic relief is occasionally achieved with the use of a heating pad.    There is no hematochezia, although she recalls one instance of spotting and occasional mucus in the stool without recurrence.  Denies melena, nocturnal bowel movements. she has not initiated any new medications recently and has discontinued melatonin and iron supplements due to adverse effects.     She denies dysphagia but reports occasional nausea. 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    MEDICATIONS  - Discontinued: iron supplements      Result Review :    Common labs          2/20/2024    13:45 2/21/2024    00:18   Common Labs   Glucose 86   "   BUN 12     Creatinine 0.78     Sodium 137     Potassium 3.7     Chloride 104     Calcium 8.8     Albumin 4.2     Total Bilirubin 0.3     Alkaline Phosphatase 64     AST (SGOT) 19     ALT (SGPT) 15     WBC 8.09     Hemoglobin 14.0  13.4    Hematocrit 42.3  40.8    Platelets 258       Office Visit with Yudith Morales APRN (01/13/2025)   reviewed and updated with patient  Outpatient Medications Marked as Taking for the 1/28/25 encounter (Office Visit) with Henny Marmolejo APRN   Medication Sig Dispense Refill    prenatal vitamin (prenatal, CLASSIC, vitamin) tablet Take 1 tablet by mouth Daily.       Vital Signs:   /80 (BP Location: Right arm, Patient Position: Sitting, Cuff Size: Adult)   Ht 157.5 cm (62.01\")   Wt 50.3 kg (111 lb)   BMI 20.30 kg/m²     Body mass index is 20.3 kg/m².  Physical Exam  Constitutional:       General: She is not in acute distress.     Appearance: Normal appearance. She is not ill-appearing.   HENT:      Head: Normocephalic.   Eyes:      General: No scleral icterus.  Pulmonary:      Effort: No respiratory distress.   Abdominal:      General: Abdomen is flat. Bowel sounds are normal. There is no distension.      Palpations: Abdomen is soft. There is no mass.      Tenderness: There is no abdominal tenderness. There is no guarding or rebound.      Hernia: No hernia is present.   Skin:     General: Skin is warm and dry.   Neurological:      Mental Status: She is alert.      Gait: Gait normal.   Psychiatric:         Mood and Affect: Mood normal.       Assessment and Plan    Diagnoses and all orders for this visit:    1. Chronic idiopathic constipation (Primary)  -     linaclotide (Linzess) 145 MCG capsule capsule; Take 1 capsule by mouth Every Morning Before Breakfast.  Dispense: 90 capsule; Refill: 3  -     CBC & Differential  -     Ferritin  -     Iron Profile  -     Folate  -     Vitamin B12  -     TSH Rfx On Abnormal To Free T4  -     Celiac Comprehensive Panel    2. Change in " bowel habits  -     CBC & Differential  -     Ferritin  -     Iron Profile  -     Folate  -     Vitamin B12  -     TSH Rfx On Abnormal To Free T4  -     Celiac Comprehensive Panel       Assessment & Plan  Constipation  - Symptoms suggest a potential motility disorder  - MiraLAX or OTC treatments may exacerbate bloating  - Provided Linzess samples: take once daily, 30 minutes before the first meal    - Initial dose may cause diarrhea    - Full effects in 2 weeks  - Prescription for Linzess will be sent  - Blood work for CBC, CMP, TSH, Celiac disease, and anemia labs due to history of anemia  - Encouraged to respond promptly to bowel movement urges  - If celiac test is positive, further investigation with a scope may be needed  - If symptoms persist, a more aggressive treatment plan may be necessary    Follow-up in 4 weeks  Follow up:   Patient is agreeable to the outlined above treatment plan.  Verbalizes understanding and will contact office for any new or worsening concerns.  All questions answered and support provided.  Patient Instructions   For Constipation:   Start Linzess 145 mcg 1 pill at least 30 minutes before first meal of the day.  Taking it with food can increase risk for diarrhea  It may take trying several different medications to find the right medication regimen to help regulate your bowel movements.    The goal for treatment with constipation is to find the best medication to promote more regular bowel movements with complete evacuation.  It can take up to 14 days to see full benefit of medication and daily use of Linzess helps to promote treatment success.    Fiber recommendations:    Goal of constipation regimen is to produce at least 3 complete bowel movements per week   Recommend starting a soluble fiber regimen that includes psyllium such as Metamucil.    This helps to keep stool soft and formed and easy transit throughout the colon to produce regular and complete bowel movements.  Consume at  least 20 to 30 g of dietary fiber per day  Research has proven that consuming 5 prunes or 2 kiwi fruit per day can also aid in reducing constipation.  When starting fiber regimen recommend starting with a low-dose and gradually increasing by 1 tablespoon every 7 days as tolerated.    This will help your body acclimate to the higher fiber diet and reduce risk of unwanted side effects that include bloating, gas, abdominal fullness, and cramping  For example: Begin taking 1 tablespoon daily for at least 7 days, if this is not successful in producing regular bowel movements can begin taking 1 tablespoons twice a day, and finally if this is not successful after taking 2 tablespoons for 7 days, can further increase to maximum recommended dosing of 1 tablespoon 3 times per day.  If you choose capsule formulation:  Begin taking 3 capsules daily in the morning with at least 8 ounces of water, then increase by 1 capsule every 7 days until stool becomes soft and bowel movements are complete and sensation.  Do not exceed maximum package dosing  It is important to consume increased amounts of fluid throughout the day to help improve the effectiveness of the fiber supplementation  Avoid gummy formulations of fiber as this can further increase your risk of the above adverse effects due to artificial sweeteners in the Gummies.    Blood work today     Once we receive these results, our office will contact you to discuss updating your treatment plan as indicated        EMR Dragon/Transcription Disclaimer:  This document has been Dictated utilizing Dragon dictation.   Patient or patient representative verbalized consent for the use of Ambient Listening during the visit with  VELVET Cunningham for chart documentation. 1/28/2025  16:06 EST

## 2025-01-28 NOTE — PATIENT INSTRUCTIONS
For Constipation:   Start Linzess 145 mcg 1 pill at least 30 minutes before first meal of the day.  Taking it with food can increase risk for diarrhea  It may take trying several different medications to find the right medication regimen to help regulate your bowel movements.    The goal for treatment with constipation is to find the best medication to promote more regular bowel movements with complete evacuation.  It can take up to 14 days to see full benefit of medication and daily use of Linzess helps to promote treatment success.    Fiber recommendations:    Goal of constipation regimen is to produce at least 3 complete bowel movements per week   Recommend starting a soluble fiber regimen that includes psyllium such as Metamucil.    This helps to keep stool soft and formed and easy transit throughout the colon to produce regular and complete bowel movements.  Consume at least 20 to 30 g of dietary fiber per day  Research has proven that consuming 5 prunes or 2 kiwi fruit per day can also aid in reducing constipation.  When starting fiber regimen recommend starting with a low-dose and gradually increasing by 1 tablespoon every 7 days as tolerated.    This will help your body acclimate to the higher fiber diet and reduce risk of unwanted side effects that include bloating, gas, abdominal fullness, and cramping  For example: Begin taking 1 tablespoon daily for at least 7 days, if this is not successful in producing regular bowel movements can begin taking 1 tablespoons twice a day, and finally if this is not successful after taking 2 tablespoons for 7 days, can further increase to maximum recommended dosing of 1 tablespoon 3 times per day.  If you choose capsule formulation:  Begin taking 3 capsules daily in the morning with at least 8 ounces of water, then increase by 1 capsule every 7 days until stool becomes soft and bowel movements are complete and sensation.  Do not exceed maximum package dosing  It is  important to consume increased amounts of fluid throughout the day to help improve the effectiveness of the fiber supplementation  Avoid gummy formulations of fiber as this can further increase your risk of the above adverse effects due to artificial sweeteners in the Gummies.    Blood work today     Once we receive these results, our office will contact you to discuss updating your treatment plan as indicated

## 2025-01-30 LAB
ENDOMYSIUM IGA SER QL: NEGATIVE
GLIADIN PEPTIDE IGA SER-ACNC: 3 UNITS (ref 0–19)
GLIADIN PEPTIDE IGG SER-ACNC: 2 UNITS (ref 0–19)
IGA SERPL-MCNC: 179 MG/DL (ref 87–352)
TTG IGA SER-ACNC: <2 U/ML (ref 0–3)
TTG IGG SER-ACNC: 4 U/ML (ref 0–5)